# Patient Record
Sex: MALE | Race: OTHER | Employment: FULL TIME | URBAN - METROPOLITAN AREA
[De-identification: names, ages, dates, MRNs, and addresses within clinical notes are randomized per-mention and may not be internally consistent; named-entity substitution may affect disease eponyms.]

---

## 2021-04-08 ENCOUNTER — HOSPITAL ENCOUNTER (OUTPATIENT)
Dept: GENERAL RADIOLOGY | Facility: HOSPITAL | Age: 36
Discharge: HOME | End: 2021-04-08
Attending: INTERNAL MEDICINE
Payer: COMMERCIAL

## 2021-04-08 ENCOUNTER — OFFICE VISIT (OUTPATIENT)
Dept: RHEUMATOLOGY | Facility: CLINIC | Age: 36
End: 2021-04-08
Payer: COMMERCIAL

## 2021-04-08 ENCOUNTER — LAB ENCOUNTER (OUTPATIENT)
Dept: LAB | Facility: HOSPITAL | Age: 36
End: 2021-04-08
Attending: INTERNAL MEDICINE
Payer: COMMERCIAL

## 2021-04-08 VITALS
HEART RATE: 84 BPM | SYSTOLIC BLOOD PRESSURE: 132 MMHG | WEIGHT: 235 LBS | BODY MASS INDEX: 30.16 KG/M2 | HEIGHT: 74 IN | DIASTOLIC BLOOD PRESSURE: 87 MMHG

## 2021-04-08 DIAGNOSIS — M47.899 HLA-B27 SPONDYLOARTHROPATHY: ICD-10-CM

## 2021-04-08 DIAGNOSIS — M46.90 DACTYLITIS DUE TO SPONDYLOARTHRITIC DISORDER (HCC): ICD-10-CM

## 2021-04-08 DIAGNOSIS — G89.29 CHRONIC MIDLINE THORACIC BACK PAIN: ICD-10-CM

## 2021-04-08 DIAGNOSIS — M54.6 CHRONIC MIDLINE THORACIC BACK PAIN: ICD-10-CM

## 2021-04-08 DIAGNOSIS — M47.899 HLA-B27 SPONDYLOARTHROPATHY: Primary | ICD-10-CM

## 2021-04-08 PROCEDURE — 86480 TB TEST CELL IMMUN MEASURE: CPT

## 2021-04-08 PROCEDURE — 72202 X-RAY EXAM SI JOINTS 3/> VWS: CPT | Performed by: INTERNAL MEDICINE

## 2021-04-08 PROCEDURE — 3075F SYST BP GE 130 - 139MM HG: CPT | Performed by: INTERNAL MEDICINE

## 2021-04-08 PROCEDURE — 3079F DIAST BP 80-89 MM HG: CPT | Performed by: INTERNAL MEDICINE

## 2021-04-08 PROCEDURE — 86706 HEP B SURFACE ANTIBODY: CPT | Performed by: INTERNAL MEDICINE

## 2021-04-08 PROCEDURE — 99244 OFF/OP CNSLTJ NEW/EST MOD 40: CPT | Performed by: INTERNAL MEDICINE

## 2021-04-08 PROCEDURE — 86704 HEP B CORE ANTIBODY TOTAL: CPT | Performed by: INTERNAL MEDICINE

## 2021-04-08 PROCEDURE — 72072 X-RAY EXAM THORAC SPINE 3VWS: CPT | Performed by: INTERNAL MEDICINE

## 2021-04-08 PROCEDURE — 86235 NUCLEAR ANTIGEN ANTIBODY: CPT

## 2021-04-08 PROCEDURE — 86803 HEPATITIS C AB TEST: CPT | Performed by: INTERNAL MEDICINE

## 2021-04-08 PROCEDURE — 82955 ASSAY OF G6PD ENZYME: CPT | Performed by: INTERNAL MEDICINE

## 2021-04-08 PROCEDURE — 36415 COLL VENOUS BLD VENIPUNCTURE: CPT | Performed by: INTERNAL MEDICINE

## 2021-04-08 PROCEDURE — 87340 HEPATITIS B SURFACE AG IA: CPT | Performed by: INTERNAL MEDICINE

## 2021-04-08 PROCEDURE — 86225 DNA ANTIBODY NATIVE: CPT

## 2021-04-08 PROCEDURE — 3008F BODY MASS INDEX DOCD: CPT | Performed by: INTERNAL MEDICINE

## 2021-04-08 RX ORDER — METHYLPREDNISOLONE 4 MG/1
TABLET ORAL
Qty: 1 PACKAGE | Refills: 0 | Status: SHIPPED | OUTPATIENT
Start: 2021-04-08

## 2021-04-08 RX ORDER — FAMOTIDINE 20 MG/1
20 TABLET ORAL 2 TIMES DAILY
COMMUNITY
Start: 2020-01-30

## 2021-04-08 RX ORDER — IBUPROFEN 600 MG/1
600 TABLET ORAL 2 TIMES DAILY
COMMUNITY
Start: 2021-03-16

## 2021-04-08 RX ORDER — LEVOTHYROXINE SODIUM 0.03 MG/1
25 TABLET ORAL EVERY MORNING
COMMUNITY
Start: 2021-03-18

## 2021-04-08 NOTE — PATIENT INSTRUCTIONS
You were seen today for a positive HLA-B27, mid back pain and left fourth toe swelling  Symptoms appear to be consistent with ankylosing spondylitis.   Want to figure out if the ankylosing spondylitis is affecting your back  Plan to get some more blood work

## 2021-04-08 NOTE — PROGRESS NOTES
Herve Selby is a 29year old male who presents for Patient presents with:  Consult  Toe Pain: right toe pain  Back Pain  Test Results  .    HPI:   CC: R 4th toe pain and swelling, +HLA-B27  Consult: referred by podiatrist Dr. Eleanor Dominguez    This is a 3 Oral Tab Take 25 mcg by mouth every morning. ON AN EMPTY STOMACH     • ibuprofen 600 MG Oral Tab Take 600 mg by mouth 2 (two) times daily. History reviewed. No pertinent past medical history. History reviewed. No pertinent surgical history.    Yaya or swelling or warmth on palpation  Shoulders: FROM, no pain or swelling or warmth on palpation  Hip: normal log roll, no lateral hip pain, RYAN test negative b/l  Knees: FROM, no warmth or effusion present. No pain with ROM.    Ankles: FROM, no pain or sw

## 2021-04-13 ENCOUNTER — TELEPHONE (OUTPATIENT)
Dept: RHEUMATOLOGY | Facility: CLINIC | Age: 36
End: 2021-04-13

## 2021-04-13 DIAGNOSIS — M47.899 HLA-B27 SPONDYLOARTHROPATHY: Primary | ICD-10-CM

## 2021-04-13 DIAGNOSIS — M46.90 DACTYLITIS DUE TO SPONDYLOARTHRITIC DISORDER (HCC): ICD-10-CM

## 2021-04-14 NOTE — TELEPHONE ENCOUNTER
Patient is returning missed call. He was informed of message below but would like to know the results of his x-rays before proceeding with the MRI's. Patient is requesting a call back after 3 pm. He is currently at work.

## 2021-05-13 ENCOUNTER — HOSPITAL ENCOUNTER (OUTPATIENT)
Dept: MRI IMAGING | Facility: HOSPITAL | Age: 36
Discharge: HOME | End: 2021-05-13
Attending: INTERNAL MEDICINE
Payer: COMMERCIAL

## 2021-05-13 DIAGNOSIS — M47.899 HLA-B27 SPONDYLOARTHROPATHY: ICD-10-CM

## 2021-05-13 DIAGNOSIS — M46.90 DACTYLITIS DUE TO SPONDYLOARTHRITIC DISORDER (HCC): ICD-10-CM

## 2021-05-13 PROCEDURE — 72157 MRI CHEST SPINE W/O & W/DYE: CPT | Performed by: INTERNAL MEDICINE

## 2021-05-13 PROCEDURE — A9575 INJ GADOTERATE MEGLUMI 0.1ML: HCPCS | Performed by: INTERNAL MEDICINE

## 2021-05-13 PROCEDURE — 72195 MRI PELVIS W/O DYE: CPT | Performed by: INTERNAL MEDICINE

## 2021-05-20 ENCOUNTER — TELEPHONE (OUTPATIENT)
Dept: RHEUMATOLOGY | Facility: CLINIC | Age: 36
End: 2021-05-20

## 2021-05-20 NOTE — TELEPHONE ENCOUNTER
Patient called for some clarification regarding his call with Dr. Damien Diaz. He had questions about sulfasalazine. I informed him that this medication can be used to reduce inflammation and pain associated with his arthritis.  Patient also wanted to inform Dr. Tc Slater

## 2021-06-02 RX ORDER — SULFASALAZINE 500 MG/1
TABLET ORAL
Qty: 120 TABLET | Refills: 1 | Status: SHIPPED | OUTPATIENT
Start: 2021-06-02

## 2021-06-02 RX ORDER — CYCLOBENZAPRINE HCL 5 MG
5 TABLET ORAL NIGHTLY PRN
Qty: 30 TABLET | Refills: 0 | Status: SHIPPED | OUTPATIENT
Start: 2021-06-02

## 2021-06-02 NOTE — TELEPHONE ENCOUNTER
Patient with seropositive spondylarthritis, MRI of the thoracic and lumbar spine were normal, plan to start sulfasalazine and Flexeril for muscle spasms.   He will follow-up in 2 months

## 2023-08-01 ENCOUNTER — APPOINTMENT (OUTPATIENT)
Dept: ULTRASOUND IMAGING | Age: 38
End: 2023-08-01
Attending: FAMILY MEDICINE

## 2023-11-16 ENCOUNTER — OFFICE VISIT (OUTPATIENT)
Facility: CLINIC | Age: 38
End: 2023-11-16
Payer: COMMERCIAL

## 2023-11-16 VITALS
BODY MASS INDEX: 29.65 KG/M2 | HEIGHT: 74 IN | DIASTOLIC BLOOD PRESSURE: 78 MMHG | WEIGHT: 231 LBS | HEART RATE: 75 BPM | SYSTOLIC BLOOD PRESSURE: 128 MMHG

## 2023-11-16 DIAGNOSIS — K21.9 GASTROESOPHAGEAL REFLUX DISEASE WITHOUT ESOPHAGITIS: Primary | ICD-10-CM

## 2023-11-16 PROCEDURE — 3074F SYST BP LT 130 MM HG: CPT | Performed by: INTERNAL MEDICINE

## 2023-11-16 PROCEDURE — 99204 OFFICE O/P NEW MOD 45 MIN: CPT | Performed by: INTERNAL MEDICINE

## 2023-11-16 PROCEDURE — 3008F BODY MASS INDEX DOCD: CPT | Performed by: INTERNAL MEDICINE

## 2023-11-16 PROCEDURE — 3078F DIAST BP <80 MM HG: CPT | Performed by: INTERNAL MEDICINE

## 2023-11-16 NOTE — H&P
[address] [facility] [address] Gastroenterology                                                                                                               Reason for consult: inability to burp     Requesting physician or provider: No primary care provider on file. Chief Complaint   Patient presents with    Consult     Upper pressure; burping issues; GERD; slight upper pain; constant throat clearing        HPI:   Marlyn Chand is a 41year old year-old man with no significant past medical history presenting for evaluation of inability to burp. He feels like he has a blockage on the left side of his throat and chest. He describes this as feeling like he needs to burp but is unable to burp. He feels like the right side of his throat and chest does not have this issue. He will suck in air to try and burp and will be unable to. He also notes feeling of reflux with gurgling in his esophagus. He feels like food comes up but does not regurgitate or vomit. He denies nausea. He denies dysphagia. He does note some trouble breathing - he feels like he has to stop and take a deep breath and \"catch himself\". He also notes pain in his chest with coughing. He feels chest pressure to his left side. He was recently seen by his PCP and recommended to have CXR, which was normal. He was prescribed medrol dospak for costochondritis. He notes that his sx have been present since 08/2023. He was prescribed nexium in 09/2023 and took this for one month, then stopped this for about a month and has now restarted this. He has been taking nexium first thing in the morning but eats his first meal at lunch. He previously had an EGD in 2017 with LA grade A esophagitis.      Soc:  -Denies smoking  -Endorses social EtOH use    Wt Readings from Last 6 Encounters:   11/16/23 231 lb (104.8 kg)   04/08/21 235 lb (106.6 kg)        History, Medications, Allergies, ROS: History reviewed. No pertinent past medical history. History reviewed. No pertinent surgical history. Family Hx:   Family History   Family history unknown: Yes      Social History:   Social History     Socioeconomic History    Marital status:    Tobacco Use    Smoking status: Never    Smokeless tobacco: Never   Vaping Use    Vaping Use: Never used   Substance and Sexual Activity    Alcohol use: Yes     Comment: Social        Medications (Active prior to today's visit):  Current Outpatient Medications   Medication Sig Dispense Refill    sulfaSALAzine 500 MG Oral Tab With meals, 500 mg twice a day for 2 weeks then increase to 1000 mg twice a day 120 tablet 1    cyclobenzaprine 5 MG Oral Tab Take 1 tablet (5 mg total) by mouth nightly as needed for Muscle spasms. 30 tablet 0    famoTIDine 20 MG Oral Tab Take 20 mg by mouth 2 (two) times daily. Levothyroxine Sodium 25 MCG Oral Tab Take 25 mcg by mouth every morning. ON AN EMPTY STOMACH      ibuprofen 600 MG Oral Tab Take 600 mg by mouth 2 (two) times daily. methylPREDNISolone 4 MG Oral Tablet Therapy Pack Take as directed on package. 1 Package 0       Allergies:  No Known Allergies    ROS:   CONSTITUTIONAL:  negative for fevers, rigors  EYES:  negative for diplopia   RESPIRATORY:  negative for severe shortness of breath  CARDIOVASCULAR:  negative for crushing sub-sternal chest pain  GASTROINTESTINAL:  see HPI  GENITOURINARY:  negative for dysuria or gross hematuria  INTEGUMENT/BREAST:  SKIN:  negative for jaundice   ALLERGIC/IMMUNOLOGIC:  negative for hay fever  ENDOCRINE:  negative for cold intolerance and heat intolerance  MUSCULOSKELETAL:  negative for joint effusion/severe erythema  BEHAVIOR/PSYCH:  negative for psychotic behavior    PHYSICAL EXAM:   Blood pressure 128/78, pulse 75, height 6' 2\" (1.88 m), weight 231 lb (104.8 kg).     Gen: appears comfortable and in no acute distress  HEENT: sclera appear anicteric, oropharynx clear, mucus membranes appear moist  CV: regular rate, the extremities are warm and well-perfused   Lung: no increased work of breathing, no conversational dyspnea   Abd: soft, non-tender exam in all quadrants without rigidity or guarding, non-distended, no masses palpated  Skin: no jaundice, no apparent rashes   Neuro: alert and interactive, no focal neuro deficits  Psych: cooperative, normal affect     Labs/Imaging:     Patient's pertinent labs and imaging were reviewed and discussed with patient today. ASSESSMENT/PLAN:   Alyssa Stewart is a 41year old year-old man with no significant past medical history presenting for evaluation of inability to burp. Pt presenting with vague symptoms. He describes this feeling like he needs to burp or relieve gas pressure from his left side of his chest and throat, but is unable to. He does not feel the same on the right side. It is unclear if this is more a pulmonary issue vs GI issue since he describes left chest discomfort and some difficulty breathing but then relates this to burping. He also endorses reflux and feels like food comes up his esophagus. He denies regurgitation or vomiting. He denies dysphagia. He previously had an EGD in 2017 with LA grade A esophagitis. He restarted a daily PPI in September and then discontinued this for a month - he was recently restarted on this. Recommended he continue this for now with dosing 30-60 minutes before his first meal of the day. We discussed obtaining ENT evaluation since he notes throat discomfort ? Blockage on the left side and a fluoroesophagram. Given reflux and history of esophagitis, can plan to reassess esophagus with EGD. Recommendations:  Lifestyle and dietary modifications were discussed today including but not limited to:     *Elevated head of bed at night      *Refrain from laying down after consuming a meal and do not eat meals 2-3 hours before bedtime. *Avoid tight clothing or belts. *Weight loss as able. *Eliminate dietary triggers (examples: caffeine, chocolate, spicy foods, fried/fatty foods, peppermint and carbonated beverages). *Avoid tobacco and alcohol as both can worsen acid reflux (reduces to pressure of the lower esophageal valve and smoking also reduces saliva production). *Consider oral lozenges or chewing gum throughout the day (promotes saliva to neutralize refluxed acid). *Avoid NSAIDs (ibuprofen, motrin, aleve, naproxen, etc) and aspirin as able. Continue nexium for now. Take this once a day 30-60 minutes before your first meal of the day. Schedule a swallow x-ray. 1. Schedule an upper endoscopy (EGD) with MAC [Diagnosis: reflux unresponsive to PPI]    2. Do not eat or drink after midnight the night before your procedure. 3. Medication adjustments: Continue ALL medications as usual.    4. If you start any NEW medication after your visit today, please notify us. Certain medications will need to be held before the procedure, or the procedure cannot be performed. 5. You will need a ride home from your procedure since you are receiving sedation. Please ensure you will have an available ride home or the procedure cannot be performed. Orders This Visit:  No orders of the defined types were placed in this encounter.       Meds This Visit:  Requested Prescriptions      No prescriptions requested or ordered in this encounter       Imaging & Referrals:  ENT - INTERNAL  XR UGI/ESOPHAGUS DOUBLE CONTRAST (NCP=[id number])       Adenike Salcedo MD  [facility] Gastroenterology  11/16/2023

## 2023-11-16 NOTE — PATIENT INSTRUCTIONS
Lifestyle and dietary modifications were discussed today including but not limited to:     *Elevated head of bed at night      *Refrain from laying down after consuming a meal and do not eat meals 2-3 hours before bedtime. *Avoid tight clothing or belts. *Weight loss as able. *Eliminate dietary triggers (examples: caffeine, chocolate, spicy foods, fried/fatty foods, peppermint and carbonated beverages). *Avoid tobacco and alcohol as both can worsen acid reflux (reduces to pressure of the lower esophageal valve and smoking also reduces saliva production). *Consider oral lozenges or chewing gum throughout the day (promotes saliva to neutralize refluxed acid). *Avoid NSAIDs (ibuprofen, motrin, aleve, naproxen, etc) and aspirin as able. Continue nexium for now. Take this once a day 30-60 minutes before your first meal of the day. Schedule a swallow x-ray. 1. Schedule an upper endoscopy (EGD) with MAC [Diagnosis: reflux unresponsive to PPI]    2. Do not eat or drink after midnight the night before your procedure. 3. Medication adjustments: Continue ALL medications as usual.    4. If you start any NEW medication after your visit today, please notify us. Certain medications will need to be held before the procedure, or the procedure cannot be performed. 5. You will need a ride home from your procedure since you are receiving sedation. Please ensure you will have an available ride home or the procedure cannot be performed.

## 2024-02-29 ENCOUNTER — TELEPHONE (OUTPATIENT)
Facility: CLINIC | Age: 39
End: 2024-02-29

## 2024-02-29 DIAGNOSIS — K21.9 GASTROESOPHAGEAL REFLUX DISEASE, UNSPECIFIED WHETHER ESOPHAGITIS PRESENT: Primary | ICD-10-CM

## 2024-02-29 NOTE — TELEPHONE ENCOUNTER
Scheduled for:  EGD 07129  Provider Name:  Dr Guevara  Date:  03/01/2024   Location:  [facility]  Sedation:  MAC  Time:  11:30am(Patient is aware arrival time is at 10:30am)  Prep:  NPO after Midnight Prep Instructions Given At The Office Visit.    Meds/Allergies Reconciled?:  Physician Reviewed  Diagnosis with codes:  GERD K21.9  Was patient informed to call insurance with codes (Y/N):  Yes  Referral sent?:  Referral was sent at the time of electronic surgical scheduling.  EM or [facility] notified?:  I sent an electronic request to Endo Scheduling and received a confirmation today.  Medication Orders:  Pt is aware to NOT take iron pills, herbal meds and diet supplements for 7 days before exam. Also to NOT take any form of alcohol, recreational drugs and any forms of ED meds 24 hours before exam.   Misc Orders:  Patient was informed that they will need a COVID 19 test prior to their procedure. Patient verbally understood & will await a phone call from [facility] to schedule.       Further instructions given by staff:  I provide prep instructions to patient at the time of the appointment and reviewed date, time and location, he verbalized that he understood and is aware to call if he has any questions.    Patient was informed about the new cancellation policy for his/her procedure. Patient was also given a copy of the cancellation policy at the time of the appointment and verbalized understanding.

## 2024-03-01 ENCOUNTER — HOSPITAL ENCOUNTER (OUTPATIENT)
Age: 39
Setting detail: HOSPITAL OUTPATIENT SURGERY
Discharge: HOME | End: 2024-03-01
Attending: INTERNAL MEDICINE | Admitting: INTERNAL MEDICINE
Payer: COMMERCIAL

## 2024-03-01 ENCOUNTER — ANESTHESIA EVENT (OUTPATIENT)
Dept: ENDOSCOPY | Age: 39
End: 2024-03-01
Payer: COMMERCIAL

## 2024-03-01 ENCOUNTER — ANESTHESIA (OUTPATIENT)
Dept: ENDOSCOPY | Age: 39
End: 2024-03-01
Payer: COMMERCIAL

## 2024-03-01 VITALS
RESPIRATION RATE: 16 BRPM | DIASTOLIC BLOOD PRESSURE: 73 MMHG | BODY MASS INDEX: 29.52 KG/M2 | WEIGHT: 230 LBS | OXYGEN SATURATION: 99 % | TEMPERATURE: 98 F | HEART RATE: 52 BPM | HEIGHT: 74 IN | SYSTOLIC BLOOD PRESSURE: 102 MMHG

## 2024-03-01 DIAGNOSIS — J39.2 THROAT IRRITATION: Primary | ICD-10-CM

## 2024-03-01 DIAGNOSIS — K21.9 GASTROESOPHAGEAL REFLUX DISEASE, UNSPECIFIED WHETHER ESOPHAGITIS PRESENT: ICD-10-CM

## 2024-03-01 PROCEDURE — 43239 EGD BIOPSY SINGLE/MULTIPLE: CPT | Performed by: INTERNAL MEDICINE

## 2024-03-01 PROCEDURE — 88312 SPECIAL STAINS GROUP 1: CPT | Performed by: INTERNAL MEDICINE

## 2024-03-01 PROCEDURE — 99070 SPECIAL SUPPLIES PHYS/QHP: CPT | Performed by: INTERNAL MEDICINE

## 2024-03-01 PROCEDURE — 88305 TISSUE EXAM BY PATHOLOGIST: CPT | Performed by: INTERNAL MEDICINE

## 2024-03-01 RX ORDER — LIDOCAINE HYDROCHLORIDE 10 MG/ML
INJECTION, SOLUTION EPIDURAL; INFILTRATION; INTRACAUDAL; PERINEURAL AS NEEDED
Status: DISCONTINUED | OUTPATIENT
Start: 2024-03-01 | End: 2024-03-01 | Stop reason: SURG

## 2024-03-01 RX ORDER — ESOMEPRAZOLE MAGNESIUM 40 MG/1
40 CAPSULE, DELAYED RELEASE ORAL
COMMUNITY

## 2024-03-01 RX ORDER — SODIUM CHLORIDE, SODIUM LACTATE, POTASSIUM CHLORIDE, CALCIUM CHLORIDE 600; 310; 30; 20 MG/100ML; MG/100ML; MG/100ML; MG/100ML
INJECTION, SOLUTION INTRAVENOUS CONTINUOUS
Status: DISCONTINUED | OUTPATIENT
Start: 2024-03-01 | End: 2024-03-01

## 2024-03-01 RX ORDER — NALOXONE HYDROCHLORIDE 0.4 MG/ML
0.08 INJECTION, SOLUTION INTRAMUSCULAR; INTRAVENOUS; SUBCUTANEOUS ONCE AS NEEDED
Status: DISCONTINUED | OUTPATIENT
Start: 2024-03-01 | End: 2024-03-01

## 2024-03-01 RX ADMIN — SODIUM CHLORIDE, SODIUM LACTATE, POTASSIUM CHLORIDE, CALCIUM CHLORIDE: 600; 310; 30; 20 INJECTION, SOLUTION INTRAVENOUS at 11:16:00

## 2024-03-01 RX ADMIN — LIDOCAINE HYDROCHLORIDE 50 MG: 10 INJECTION, SOLUTION EPIDURAL; INFILTRATION; INTRACAUDAL; PERINEURAL at 11:27:00

## 2024-03-01 RX ADMIN — SODIUM CHLORIDE, SODIUM LACTATE, POTASSIUM CHLORIDE, CALCIUM CHLORIDE: 600; 310; 30; 20 INJECTION, SOLUTION INTRAVENOUS at 11:39:00

## 2024-03-01 NOTE — OPERATIVE REPORT
ESOPHAGOGASTRODUODENOSCOPY (EGD) REPORT    Maxx Ceja     3/14/1985 Age 38 year old   PCP No primary care provider on file. Endoscopist Sera Guevara MD       Date of procedure: 24    Procedure: EGD w/ biopsies     Pre-operative diagnosis: uncontrolled GERD with history of esophagitis     Post-operative diagnosis: irregular z-line     Medications: MAC    Complications: none    Procedure:  Informed consent was obtained from the patient after the risks of the procedure were discussed, including but not limited to bleeding, perforation, aspiration, infection, or possibility of a missed lesion. After discussions of the risks/benefits and alternatives to this procedure, as well as the planned sedation, the patient was placed in the left lateral decubitus position and begun on continuous blood pressure pulse oximetry and EKG monitoring and this was maintained throughout the procedure. Once an adequate level of sedation was obtained a bite block was placed. Then the lubricated tip of the [id number] diagnostic video upper endoscope was inserted and advanced using direct visualization into the posterior pharynx and ultimately into the esophagus. The scope was then advanced through the stomach and to the second portion of the duodenum.    Complications: None    Findings:      1. Esophagus: The squamocolumnar junction was noted at 44 cm and appeared irregular. The diaphragmatic pinch was noted noted at 44 cm from the incisors. The esophageal mucosa appeared normal. There was no endoscopic findings of esophagitis, stricture or Cloud's esophagus. No diverticulum noted in the upper esophagus. Biopsies obtained from the distal and proximal esophagus.     2. Stomach: The stomach distended normally. Normal rugal folds were seen. The pylorus was patent. Retroflexion revealed a normal fundus. The gastric mucosa appeared normal.     3. Duodenum: The duodenal mucosa appeared normal in the bulb and 2nd portion  of the duodenum.     Impression:  -Esophagus: Irregular z-line; otherwise, normal. Biopsies obtained from the proximal and distal esophagus.   -Stomach: Normal.   -Duodenum: Normal.     Recommend:  1. Await pathology.   2. Continue nexium.   3. Complete fluoroesophagram.   4. Follow-up with ENT regarding throat issue.   5. Follow-up with PCP regarding thyroid issue.     >>>If tissue was sampled/removed and you have not received your pathology results either by phone or letter within 2 weeks, please call our office at [phone].    Specimens:  Distal esophageal biopsies, proximal esophageal biopsies     Blood loss: <1 ml

## 2024-03-01 NOTE — H&P
History & Physical Examination    Patient Name: Maxx Ceja  MRN: [id number]  [facility]: [id number]  YOB: 1985    Diagnosis: uncontrolled GERD with history of esophagitis, EGD today    Medications Prior to Admission   Medication Sig Dispense Refill Last Dose    sulfaSALAzine 500 MG Oral Tab With meals, 500 mg twice a day for 2 weeks then increase to 1000 mg twice a day 120 tablet 1     cyclobenzaprine 5 MG Oral Tab Take 1 tablet (5 mg total) by mouth nightly as needed for Muscle spasms. 30 tablet 0     famoTIDine 20 MG Oral Tab Take 20 mg by mouth 2 (two) times daily.       Levothyroxine Sodium 25 MCG Oral Tab Take 25 mcg by mouth every morning. ON AN EMPTY STOMACH       ibuprofen 600 MG Oral Tab Take 600 mg by mouth 2 (two) times daily.       methylPREDNISolone 4 MG Oral Tablet Therapy Pack Take as directed on package. 1 Package 0      No current facility-administered medications for this encounter.       Allergies: No Known Allergies    No past medical history on file.  No past surgical history on file.  Family History   Family history unknown: Yes     Social History     Tobacco Use    Smoking status: Never    Smokeless tobacco: Never   Substance Use Topics    Alcohol use: Yes     Comment: Social       SYSTEM Check if Review is Normal Check if Physical Exam is Normal If not normal, please explain:   HEENT [X ] [ X]    NECK  [X ] [ X]    HEART [X ] [ X]    LUNGS [X ] [ X]    ABDOMEN [X ] [ X]    EXTREMITIES [X ] [ X]    OTHER        I have discussed the risks and benefits and alternatives of the procedure with the patient/family.  They understand and agree to proceed with plan of care.   I have reviewed the History and Physical done within the last 30 days.  Any changes noted above.  Sera Guevara MD  [facility] Gastroenterology

## 2024-03-01 NOTE — ANESTHESIA PREPROCEDURE EVALUATION
Anesthesia PreOp Note    HPI:     Maxx Ceja is a 38 year old male who presents for preoperative consultation requested by: Sera Guevara MD    Date of Surgery: 3/1/2024    Procedure(s):  ESOPHAGOGASTRODUODENOSCOPY  Indication: Gastroesophageal reflux disease, unspecified whether esophagitis present    Relevant Problems   No relevant active problems       NPO:                         History Review:  There are no problems to display for this patient.      No past medical history on file.    No past surgical history on file.    Medications Prior to Admission   Medication Sig Dispense Refill Last Dose    sulfaSALAzine 500 MG Oral Tab With meals, 500 mg twice a day for 2 weeks then increase to 1000 mg twice a day 120 tablet 1     cyclobenzaprine 5 MG Oral Tab Take 1 tablet (5 mg total) by mouth nightly as needed for Muscle spasms. 30 tablet 0     famoTIDine 20 MG Oral Tab Take 20 mg by mouth 2 (two) times daily.       Levothyroxine Sodium 25 MCG Oral Tab Take 25 mcg by mouth every morning. ON AN EMPTY STOMACH       ibuprofen 600 MG Oral Tab Take 600 mg by mouth 2 (two) times daily.       methylPREDNISolone 4 MG Oral Tablet Therapy Pack Take as directed on package. 1 Package 0      No current Epic-ordered facility-administered medications on file.     No current Epic-ordered outpatient medications on file.       No Known Allergies    Family History   Family history unknown: Yes     Social History     Socioeconomic History    Marital status:    Tobacco Use    Smoking status: Never    Smokeless tobacco: Never   Vaping Use    Vaping Use: Never used   Substance and Sexual Activity    Alcohol use: Yes     Comment: Social       Available pre-op labs reviewed.             Vital Signs:  There is no height or weight on file to calculate BMI.   vitals were not taken for this visit.   There were no vitals filed for this visit.     Anesthesia Evaluation     Patient summary reviewed and Nursing notes  reviewed    Airway   Mallampati: II  TM distance: >3 FB  Neck ROM: full  Dental      Pulmonary - negative ROS and normal exam    breath sounds clear to auscultation  Cardiovascular - negative ROS and normal exam  Exercise tolerance: good    Rhythm: regular  Rate: normal    Neuro/Psych - negative ROS     GI/Hepatic/Renal    (+) GERD well controlled    Endo/Other    (+) hypothyroidism  Abdominal  - normal exam                 Anesthesia Plan:   ASA:  2  Plan:   MAC  Informed Consent Plan and Risks Discussed With:  Patient  Discussed plan with:  CRNA and surgeon      I have informed Maxx Ceja and/or legal guardian or family member of the nature of the anesthetic plan, benefits, risks including possible dental damage if relevant, major complications, and any alternative forms of anesthetic management.   All of the patient's questions were answered to the best of my ability. The patient desires the anesthetic management as planned.  Joe Veliz CRNA  3/1/2024 10:51 AM  Present on Admission:  **None**

## 2024-03-01 NOTE — DISCHARGE INSTRUCTIONS
Home Care Instructions for Gastroscopy with Sedation    Diet:  - Resume your regular diet as tolerated unless otherwise instructed.  - Start with light meals to minimize bloating.  - Do not drink alcohol today.    Medication:  - If you have questions about resuming your normal medications, please contact your Primary Care Physician.    Activities:  - Take it easy today. Do not return to work today.  - Do not drive today.  - Do not operate any machinery today (including kitchen equipment).    Gastroscopy:  - You may have a sore throat for 2-3 days following the exam. This is normal. Gargling with warm salt water (1/2 tsp salt to 1 glass warm water) or using throat lozenges will help.  - If you experience any sharp pain in your neck, abdomen or chest, vomiting of blood, oral temperature over 100 degrees Fahrenheit, light-headedness or dizziness, or any other problems, contact your doctor.    **If unable to reach your doctor, please go to the [facility] Emergency Room**    - Your referring physician will receive a full report of your examination.  - If you do not hear from your doctor's office within two weeks of your biopsy, please call them for your results.    You may be able to see your laboratory results in [employer] between 4 and 7 business days.  In some cases, your physician may not have viewed the results before they are released to [employer].  If you have questions regarding your results contact the physician who ordered the test/exam by phone or via [employer] by choosing \"Ask a Medical Question.\"

## 2024-03-01 NOTE — ANESTHESIA POSTPROCEDURE EVALUATION
Patient: Maxx Ceja    Procedure Summary       Date: 03/01/24 Room / Location: [facility] ENDOSCOPY 02 / [facility] ENDO    Anesthesia Start: 1125 Anesthesia Stop: 1144    Procedure: ESOPHAGOGASTRODUODENOSCOPY Diagnosis:       Gastroesophageal reflux disease, unspecified whether esophagitis present      (normal EGD)    Surgeons: Sera Guevara MD Anesthesiologist:     Anesthesia Type: MAC ASA Status: 2            Anesthesia Type: MAC    Vitals Value Taken Time   BP 96/59 03/01/24 1143   Temp 97.7 °F (36.5 °C) 03/01/24 1143   Pulse 62 03/01/24 1143   Resp 18 03/01/24 1143   SpO2 97 % 03/01/24 1143       [facility] AN Post Evaluation:   Patient Evaluated in PACU  Patient Participation: complete - patient participated  Level of Consciousness: responsive to verbal stimuli and sleepy but conscious  Pain Score: 0  Pain Management: adequate  Airway Patency:patent  Dental exam unchanged from preop  Yes    Nausea/Vomiting: none  Cardiovascular Status: blood pressure returned to baseline, hemodynamically stable and acceptable  Respiratory Status: room air, nonlabored ventilation, spontaneous ventilation and acceptable  Postoperative Hydration stable      Joe Veliz CRNA  3/1/2024 11:44 AM

## 2024-03-04 NOTE — PROGRESS NOTES
EGD biopsies c/w reflux changes. No reflux changes noted endoscopically on exam. Continue PPI daily and follow GERD lifestyle modifications.

## 2024-03-06 ENCOUNTER — TELEPHONE (OUTPATIENT)
Facility: CLINIC | Age: 39
End: 2024-03-06

## 2024-03-06 NOTE — TELEPHONE ENCOUNTER
1st reminder letter sent out via mail and [employer] for the following:   XR UGI/ESOPHAGUS DOUBLE CONTRAST (CPT=74246) (Order #[id number]) on 11/16/23

## 2024-11-13 ENCOUNTER — TELEPHONE (OUTPATIENT)
Dept: ORTHOPEDICS CLINIC | Facility: CLINIC | Age: 39
End: 2024-11-13

## 2024-11-13 DIAGNOSIS — M54.9 MID BACK PAIN: Primary | ICD-10-CM

## 2024-11-13 NOTE — TELEPHONE ENCOUNTER
[facility] Guzman on 11/25/24 for Chronic mid back pain. Please advise if imaging is needed prior.

## 2024-11-25 ENCOUNTER — OFFICE VISIT (OUTPATIENT)
Dept: ORTHOPEDICS CLINIC | Facility: CLINIC | Age: 39
End: 2024-11-25
Payer: COMMERCIAL

## 2024-11-25 DIAGNOSIS — M54.50 CHRONIC RIGHT-SIDED LOW BACK PAIN WITHOUT SCIATICA: Primary | ICD-10-CM

## 2024-11-25 DIAGNOSIS — G89.29 CHRONIC RIGHT-SIDED LOW BACK PAIN WITHOUT SCIATICA: Primary | ICD-10-CM

## 2024-11-25 PROCEDURE — 99204 OFFICE O/P NEW MOD 45 MIN: CPT | Performed by: STUDENT IN AN ORGANIZED HEALTH CARE EDUCATION/TRAINING PROGRAM

## 2024-11-26 NOTE — H&P
[facility] - ORTHOPEDICS  [address], [address], [place] [address]  [address] [place] [id number]  [phone]     NEW PATIENT VISIT - HISTORY AND PHYSICAL EXAMINATION     Name: Maxx Ceja   MRN: [id number]  Date: 11/25/24       CC: back pain    REFERRED BY: No primary care provider on file.    HPI:   Maxx Ceja is a very pleasant 39 year old male who presents today for evaluation of back pain. The distribution of symptoms are: 100% backpain and 0% leg pain. The symptoms began 5 month(s) ago without any significant injury. Since the onset, the symptoms have remained the same. Patient feels pain is aggravated by bending, twisting and improved by rest. The patient reports no numbness and no weakness.  The symptom characteristics are as follows: Patient is a 39-year-old male presenting with predominantly right side back pain.  No radicular symptoms.     Prior spine surgery: none.    Bowel and bladder symptoms: absent.    The patient has not had issues with balance and/or hand dexterity problems such as changes in penmanship or the use of buttons or zippers.    Treatment up to this time has included:    Evaluation: PCP and other spine surgeon  NSAIDS: have worked well  Narcotic use: None  Physical therapy: completed  Spinal injections: None  Others:       PMH:   History reviewed. No pertinent past medical history.    PAST SURGICAL HX:  History reviewed. No pertinent surgical history.    FAMILY HX:  Family History   Family history unknown: Yes       ALLERGIES:  Patient has no known allergies.    MEDICATIONS:   Current Outpatient Medications   Medication Sig Dispense Refill    Esomeprazole Magnesium 40 MG Oral Capsule Delayed Release Take 1 capsule (40 mg total) by mouth every morning before breakfast.      sulfaSALAzine 500 MG Oral Tab With meals, 500 mg twice a day for 2 weeks then increase to 1000 mg twice a day 120 tablet 1    cyclobenzaprine 5 MG Oral Tab Take 1  tablet (5 mg total) by mouth nightly as needed for Muscle spasms. 30 tablet 0    famoTIDine 20 MG Oral Tab Take 20 mg by mouth 2 (two) times daily.      Levothyroxine Sodium 25 MCG Oral Tab Take 25 mcg by mouth every morning. ON AN EMPTY STOMACH      ibuprofen 600 MG Oral Tab Take 600 mg by mouth 2 (two) times daily.      methylPREDNISolone 4 MG Oral Tablet Therapy Pack Take as directed on package. (Patient not taking: Reported on 3/1/2024) 1 Package 0       ROS: A comprehensive 14 point review of systems was performed and was negative aside from the aforementioned per history of present illness.    SOCIAL HX:  Social History     Tobacco Use    Smoking status: Never    Smokeless tobacco: Never   Substance Use Topics    Alcohol use: Yes     Comment: Social       PE:   There were no vitals filed for this visit.  Estimated body mass index is 29.53 kg/m² as calculated from the following:    Height as of 3/1/24: 6' 2\" (1.88 m).    Weight as of 3/1/24: 230 lb (104.3 kg).    Physical Exam  Constitutional:       Appearance: Normal appearance.   HENT:      Head: Normocephalic and atraumatic.   Eyes:      Extraocular Movements: Extraocular movements intact.   Cardiovascular:      Pulses: Normal pulses. Skin warm and well perfused.  Pulmonary:      Effort: Pulmonary effort is normal. No respiratory distress.   Skin:     General: Skin is warm.   Psychiatric:         Mood and Affect: Mood normal.     Spine Exam:    Normal gait without difficulty  Able to heel, toe, tandem gait without difficulty  Level shoulders and hips in even stance    Restricted L-spine ROM    No tenderness to palpation of L-spine    Straight leg raise test: negative    Sustained clonus: negative    LE Strength: 5/5 IP QUAD TA EHL GSC  LE Sensation: normal in L2-S1 distribution  LE reflexes: normal    Radiographic Examination/Diagnostics:  XR and MRI personally viewed, independently interpreted and radiology report was reviewed.  X-ray of the lumbar spine  demonstrates mild degenerative changes  MRI of the lumbar spine does not demonstrate any significant canal or foraminal stenosis.  There is a small right paracentral disc protrusion and annular tear    IMPRESSION/PLAN: Maxx Ceja is a 39 year old male low back pain, no significant neurologic compression.  Pain is more myofascial.  Recommend pain management for injection therapy.     Сергей Guzman MD  Orthopedic Spine Surgeon  [facility]   [address] [place] [address].[facility]  [email or web address]  t: [phone]   f: [phone]        This note was dictated using Dragon software.  While it was briefly proofread prior to completion, some grammatical, spelling, and word choice errors due to dictation may still occur.

## 2025-02-18 ENCOUNTER — LAB ENCOUNTER (OUTPATIENT)
Dept: LAB | Facility: HOSPITAL | Age: 40
End: 2025-02-18
Attending: INTERNAL MEDICINE
Payer: COMMERCIAL

## 2025-02-18 ENCOUNTER — OFFICE VISIT (OUTPATIENT)
Dept: RHEUMATOLOGY | Facility: CLINIC | Age: 40
End: 2025-02-18

## 2025-02-18 ENCOUNTER — HOSPITAL ENCOUNTER (OUTPATIENT)
Dept: GENERAL RADIOLOGY | Facility: HOSPITAL | Age: 40
Discharge: HOME | End: 2025-02-18
Attending: INTERNAL MEDICINE
Payer: COMMERCIAL

## 2025-02-18 VITALS
WEIGHT: 230 LBS | DIASTOLIC BLOOD PRESSURE: 75 MMHG | SYSTOLIC BLOOD PRESSURE: 119 MMHG | BODY MASS INDEX: 29.52 KG/M2 | HEART RATE: 69 BPM | HEIGHT: 74 IN

## 2025-02-18 DIAGNOSIS — Z15.89 HLA B27 POSITIVE: ICD-10-CM

## 2025-02-18 DIAGNOSIS — M54.9 MID BACK PAIN: ICD-10-CM

## 2025-02-18 DIAGNOSIS — M54.9 MID BACK PAIN: Primary | ICD-10-CM

## 2025-02-18 LAB
CRP SERPL-MCNC: <0.4 MG/DL (ref ?–1)
ERYTHROCYTE [SEDIMENTATION RATE] IN BLOOD: 13 MM/HR

## 2025-02-18 PROCEDURE — 3074F SYST BP LT 130 MM HG: CPT | Performed by: INTERNAL MEDICINE

## 2025-02-18 PROCEDURE — 36415 COLL VENOUS BLD VENIPUNCTURE: CPT | Performed by: INTERNAL MEDICINE

## 2025-02-18 PROCEDURE — 85652 RBC SED RATE AUTOMATED: CPT | Performed by: INTERNAL MEDICINE

## 2025-02-18 PROCEDURE — 86140 C-REACTIVE PROTEIN: CPT | Performed by: INTERNAL MEDICINE

## 2025-02-18 PROCEDURE — 3008F BODY MASS INDEX DOCD: CPT | Performed by: INTERNAL MEDICINE

## 2025-02-18 PROCEDURE — 72202 X-RAY EXAM SI JOINTS 3/> VWS: CPT | Performed by: INTERNAL MEDICINE

## 2025-02-18 PROCEDURE — 3078F DIAST BP <80 MM HG: CPT | Performed by: INTERNAL MEDICINE

## 2025-02-18 PROCEDURE — 99204 OFFICE O/P NEW MOD 45 MIN: CPT | Performed by: INTERNAL MEDICINE

## 2025-02-18 RX ORDER — MELOXICAM 7.5 MG/1
7.5 TABLET ORAL 2 TIMES DAILY
Qty: 60 TABLET | Refills: 0 | Status: SHIPPED | OUTPATIENT
Start: 2025-02-18

## 2025-02-19 ENCOUNTER — TELEPHONE (OUTPATIENT)
Dept: RHEUMATOLOGY | Facility: CLINIC | Age: 40
End: 2025-02-19

## 2025-02-19 NOTE — PATIENT INSTRUCTIONS
You were seen today for the history of positive HLA-B27  You having some worsening pain in your right mid back and now in your left  Lets evaluate again with x-rays and MRI of your SI joint  Lets also get blood work today for the inflammation markers  Try meloxicam once or twice a day  Do not take with Aleve or Motrin ibuprofen

## 2025-02-19 NOTE — PROGRESS NOTES
Maxx Ceja is a 39 year old male who presents for   Chief Complaint   Patient presents with    Joint Pain    Back Pain    Consult     Seropositive spondylarthritis   .   HPI:   CC: back pain, +HLA-B27  Consult: referred by PCP Dr. Nicolás Ayoub    This is a 38 yo M with no significant medical history presents to evaluate for back pain.  He was seen back in 2021 due to a positive HLA-B27.  Around that time he also had right fourth toe pain and swelling.  His right fourth toe was injected with cortisone by podiatry.  He had an MRI of the SI joint showing no evidence of sacroiliitis.  He also had midthoracic back pain at that time an MRI of the thoracic spine was done which was unremarkable.  He now presents with worsening mid back pain.  Around June 2024 he was running and getting ready for a 5K.  He reports having right sided mid back pain that would come and go but then became more persistent.  He was following with orthopedic and thought was more muscular.  He has tried 2 sessions of physical therapy but did not help.  He was given a muscle relaxer around September and symptoms went away for about a month but then returned back in December.  He states the pain is more in the mid spine near the ribs.  He was also given a Medrol Dosepak but did not help.  At times the pain can be a pinching and burning sensation.  He is currently taking a muscle relaxer and symptoms are not as severe. Feels 80-90% better.  He is now having pain on the left mid back region.  Any motions like twisting aggravates it.  He tried dry needling and massage as but was only temporary relief.  Denies any lower back pain.  Denies any other joint pain or swelling.  No tendinitis review-itis.  Had a recent MRI of the lower spine September 2024 showing right disc protrusion at L5-S1 and facet degeneration    Blood work:  + HLA-B27  +ASIYA 1:80 nuclear speckled pattern, neg AMIRA panel  Neg RF, CCP, CRP, ESR  MRI SI joint 2021: No evidence of  sacroiliitis      Wt Readings from Last 2 Encounters:   02/18/25 230 lb (104.3 kg)   03/01/24 230 lb (104.3 kg)     Body mass index is 29.53 kg/m².      Current Outpatient Medications   Medication Sig Dispense Refill    Esomeprazole Magnesium 40 MG Oral Capsule Delayed Release Take 1 capsule (40 mg total) by mouth every morning before breakfast.      sulfaSALAzine 500 MG Oral Tab With meals, 500 mg twice a day for 2 weeks then increase to 1000 mg twice a day 120 tablet 1    cyclobenzaprine 5 MG Oral Tab Take 1 tablet (5 mg total) by mouth nightly as needed for Muscle spasms. 30 tablet 0    famoTIDine 20 MG Oral Tab Take 20 mg by mouth 2 (two) times daily.      Levothyroxine Sodium 25 MCG Oral Tab Take 25 mcg by mouth every morning. ON AN EMPTY STOMACH      ibuprofen 600 MG Oral Tab Take 600 mg by mouth 2 (two) times daily.      methylPREDNISolone 4 MG Oral Tablet Therapy Pack Take as directed on package. (Patient not taking: Reported on 3/1/2024) 1 Package 0      No past medical history on file.   No past surgical history on file.   Family History   Family history unknown: Yes      Social History:  Social History     Socioeconomic History    Marital status:    Tobacco Use    Smoking status: Never    Smokeless tobacco: Never   Vaping Use    Vaping status: Never Used   Substance and Sexual Activity    Alcohol use: Yes     Comment: Social    Drug use: Never           REVIEW OF SYSTEMS:   Review Of Systems:  Constitutional: No fever, no change in weight or appetitie  Derm: No rashes, no oral ulcers, no alopecia, no photosensitivity, no psoriasis  HEENT: No dry eyes, no dry mouth, no Raynaud's, no nasal ulcers, no parotid swelling, no neck pain, no jaw pain, no temple pain  Eyes: No visual changes,   CVS: No chest pain, no heart disease  RS: No SOB, no Cough, No Pleurtic pain,   GI: No nausea, no vomiiting, no abominal pain, no hx of ulcer, no gastritis, no heartburn, no dyshpagia, no BRBPR or melena  : no  dysuria, no hx of miscarriages, no DVT Hx, no hx of OCP,   Neuro: No numbness or tingling, no headache, no hx of seizures,   Psych: no hx of anxiety or depression  ENDO: no hx of thyroid disease, no hx of DM  Joint/Muscluskeltal: see HPI,   All other ROS are negative.     EXAM:   /75 (BP Location: Left arm, Patient Position: Sitting, Cuff Size: adult)   Pulse 69   Ht 6' 2\" (1.88 m)   Wt 230 lb (104.3 kg)   BMI 29.53 kg/m²   GEN: AAOx3, NAD  HEENT: EOMI, PERRLA, no injection or icterus, oral mucosa moist, no oral lesions. No lymphadenopathy. No facial rash  CVS: RRR, no murmurs rubs or gallops. Equal 2+ distal pulses.   LUNGS: CTAB, no increased work of breathing  ABDOMEN:  soft NT/ND, +BS, no HSM  SKIN: No rashes or skin lesions. No nail findings  MSK:  Cervical spine: FROM  Hands: no synovitis in DIP, PIP and MCP, strong full fists  Wrist: FROM, no pain or swelling or warmth on palpation  Elbow: FROM, no pain or swelling or warmth on palpation  Shoulders: FROM, no pain or swelling or warmth on palpation  Hip: normal log roll, no lateral hip pain, RYAN test negative b/l  Knees: FROM, no warmth or effusion present. No pain with ROM.   Ankles: FROM, no pain or swelling or warmth on palpation  Feet: no pain with MTP squeeze, no toe swelling or pain or warmth on palpation with FROM  Spine: no lumbar or sacral pain on palpation.  NEURO: Cranial nerves II-XII intact grossly. 5/5 strength throughout in both upper and lower extremities, sensation intact.  PSYCH: normal mood    LABS:     Component      Latest Ref Rng 4/8/2021   Anti Double Strand DNA      <10  <10    Anti-Maciel/RNP Antibody      Negative  Negative    Anti-Sjogren's B      Negative  Negative    Anti-Sjogren's A      Negative  Negative    Anti-Smith Antibody      Negative  Negative        IMAGING:     MRI SI joint 2021:  Mild degenerative changes of the sacroiliac joints without sacroiliitis.     ASSESSMENT AND PLAN:     Mid thoracic back pain  - He  is having mid thoracic right sided back pain since the summer 2024  - He was found to have a positive HLA-B27, MRI of the SI joint in 2021 showed no evidence of sacroiliitis  - He had a recent MRI of the lower spine September 2024 showing right disc protrusion at L5-S1 and facet degeneration  - Symptoms have improved on Flexeril about 80 to 90%  - Plan to obtain blood work for inflammation  - Plan to also repeat x-ray of the SI joint and MRI of the SI joint  - Symptoms do not appear to be consistent with ankylosing spondylitis.  - I did prescribe meloxicam to take once or twice a day as needed.  Advised to avoid any other NSAIDs and to take with food  - Depending on results he may have to see pain management    Thank you for allowing me to participate in this patients care. Pt will be notified of results and if follow-up is needed    Janey Spencer MD  2/18/2025  6:03 PM

## 2025-02-19 NOTE — TELEPHONE ENCOUNTER
Current Outpatient Medications   Medication Sig Dispense Refill    Meloxicam 7.5 MG Oral Tab Take 1 tablet (7.5 mg total) by mouth in the morning and 1 tablet (7.5 mg total) before bedtime. 60 tablet 0       Key: [id number]

## 2025-02-26 NOTE — TELEPHONE ENCOUNTER
Called [facility] and was connected to [facility]. Informed by T[employer] that patient doesn't have active insurance.    No PA was needed patient picked up medication on 2/24/25

## (undated) DEVICE — KIT CLEAN ENDOKIT 1.1OZ GOWNX2

## (undated) DEVICE — KIT ENDO ORCAPOD 160/180/190

## (undated) DEVICE — YANKAUER,BULB TIP,W/O VENT,RIGID,STERILE: Brand: MEDLINE

## (undated) DEVICE — 60 ML SYRINGE REGULAR TIP: Brand: MONOJECT

## (undated) DEVICE — CONMED SCOPE SAVER BITE BLOCK, 20X27 MM: Brand: SCOPE SAVER

## (undated) DEVICE — Device: Brand: DUAL NARE NASAL CANNULAE FEMALE LUER CON 7FT O2 TUBE

## (undated) DEVICE — GIJAW SINGLE-USE BIOPSY FORCEPS WITH NEEDLE: Brand: GIJAW

## (undated) NOTE — LETTER
[facility]  Administration of Anesthesia  IMaxx agree to be cared for by a physician anesthesiologist alone and/or with a nurse anesthetist, who is specially trained to monitor me and give me medicine to put me to sleep or keep me comfortable during my procedure    I understand that my anesthesiologist and/or anesthetist is not an employee or agent of [facility] or [facility] [employer]. He or she works for [facility]    As the patient asking for anesthesia services, I agree to:  Allow the anesthesiologist (anesthesia doctor) to give me medicine and do additional procedures as necessary. Some examples are: Starting or using an “IV” to give me medicine, fluids or blood during my procedure, and having a breathing tube placed to help me breathe when I’m asleep (intubation). In the event that my heart stops working properly, I understand that my anesthesiologist will make every effort to sustain my life, unless otherwise directed by [facility] Do Not Resuscitate documents.  Tell my anesthesia doctor before my procedure:  If I am pregnant.  The last time that I ate or drank.  iii. All of the medicines I take (including prescriptions, herbal supplements, and pills I can buy without a prescription (including street drugs/illegal medications). Failure to inform my anesthesiologist about these medicines may increase my risk of anesthetic complications.  iv.If I am allergic to anything or have had a reaction to anesthesia before.  I understand how the anesthesia medicine will help me (benefits).  I understand that with any type of anesthesia medicine there are risks:  The most common risks are: nausea, vomiting, sore throat, muscle soreness, damage to my eyes, mouth, or teeth (from breathing tube placement).  Rare risks include: remembering what happened during my procedure, allergic reactions to medications, injury to my airway, heart, lungs, vision, nerves, or  muscles and in extremely rare instances death.  My doctor has explained to me other choices available to me for my care (alternatives).  Pregnant Patients (“epidural”):  I understand that the risks of having an epidural (medicine given into my back to help control pain during labor), include itching, low blood pressure, difficulty urinating, headache or slowing of the baby’s heart. Very rare risks include infection, bleeding, seizure, irregular heart rhythms and nerve injury.  Regional Anesthesia (“spinal”, “epidural”, & “nerve blocks”):  I understand that rare but potential complications include headache, bleeding, infection, seizure, irregular heart rhythms, and nerve injury.    _____________________________________________________________________________  Patient (or Representative) Signature/Relationship to Patient  Date   Time    _____________________________________________________________________________   Name (if used)    Language/Organization   Time    _____________________________________________________________________________  Nurse Anesthetist Signature     Date   Time  _____________________________________________________________________________  Anesthesiologist Signature     Date   Time  I have discussed the procedure and information above with the patient (or patient’s representative) and answered their questions. The patient or their representative has agreed to have anesthesia services.    _____________________________________________________________________________  Witness        Date   Time  I have verified that the signature is that of the patient or patient’s representative, and that it was signed before the procedure  Patient Name: Maxx Ceja     : 3/14/1985                 Printed: 2024 at 1:46 PM    Medical Record #: [id number]                                            Page 1 of 1  ----------ANESTHESIA CONSENT----------

## (undated) NOTE — LETTER
[address], [place], [place]  Authorization for Surgical Operation and Procedure                                                                                           I hereby authorize Sera Guevara MD, my physician and his/her assistants (if applicable), which may include medical students, residents, and/or fellows, to perform the following surgical operation/ procedure and administer such anesthesia as may be determined necessary by my physician: Operation/Procedure name (s) ESOPHAGOGASTRODUODENOSCOPY on Maxx Ceja   2.   I recognize that during the surgical operation/procedure, unforeseen conditions may necessitate additional or different procedures than those listed above.  I, therefore, further authorize and request that the above-named surgeon, assistants, or designees perform such procedures as are, in their judgment, necessary and desirable.    3.   My surgeon/physician has discussed prior to my surgery the potential benefits, risks and side effects of this procedure; the likelihood of achieving goals; and potential problems that might occur during recuperation.  They also discussed reasonable alternatives to the procedure, including risks, benefits, and side effects related to the alternatives and risks related to not receiving this procedure.  I have had all my questions answered and I acknowledge that no guarantee has been made as to the result that may be obtained.    4.   Should the need arise during my operation/procedure, which includes change of level of care prior to discharge, I also consent to the administration of blood and/or blood products.  Further, I understand that despite careful testing and screening of blood or blood products by collecting agencies, I may still be subject to ill effects as a result of receiving a blood transfusion and/or blood products.  The following are some, but not all, of the potential risks that can occur:  fever and allergic reactions, hemolytic reactions, transmission of diseases such as Hepatitis, AIDS and Cytomegalovirus (CMV) and fluid overload.  In the event that I wish to have an autologous transfusion of my own blood, or a directed donor transfusion, I will discuss this with my physician.  Check only if Refusing Blood or Blood Products  I understand refusal of blood or blood products as deemed necessary by my physician may have serious consequences to my condition to include possible death. I hereby assume responsibility for my refusal and release the hospital, its personnel, and my physicians from any responsibility for the consequences of my refusal.    o  Refuse   5.   I authorize the use of any specimen, organs, tissues, body parts or foreign objects that may be removed from my body during the operation/procedure for diagnosis, research or teaching purposes and their subsequent disposal by hospital authorities.  I also authorize the release of specimen test results and/or written reports to my treating physician on the hospital medical staff or other referring or consulting physicians involved in my care, at the discretion of the Pathologist or my treating physician.    6.   I consent to the photographing or videotaping of the operations or procedures to be performed, including appropriate portions of my body for medical, scientific, or educational purposes, provided my identity is not revealed by the pictures or by descriptive texts accompanying them.  If the procedure has been photographed/videotaped, the surgeon will obtain the original picture, image, videotape or CD.  The hospital will not be responsible for storage, release or maintenance of the picture, image, tape or CD.    7.   I consent to the presence of a  or observers in the operating room as deemed necessary by my physician or their designees.    8.   I recognize that in the event my procedure results in extended  X-Ray/fluoroscopy time, I may develop a skin reaction.    9. If I have a Do Not Attempt Resuscitation (DNAR) order in place, that status will be suspended while in the operating room, procedural suite, and during the recovery period unless otherwise explicitly stated by me (or a person authorized to consent on my behalf). The surgeon or my attending physician will determine when the applicable recovery period ends for purposes of reinstating the DNAR order.  10. Patients having a sterilization procedure: I understand that if the procedure is successful the results will be permanent and it will therefore be impossible for me to inseminate, conceive, or bear children.  I also understand that the procedure is intended to result in sterility, although the result has not been guaranteed.   11. I acknowledge that my physician has explained sedation/analgesia administration to me including the risk and benefits I consent to the administration of sedation/analgesia as may be necessary or desirable in the judgment of my physician.    I CERTIFY THAT I HAVE READ AND FULLY UNDERSTAND THE ABOVE CONSENT TO OPERATION and/or OTHER PROCEDURE.     _________________________________________ _________________________________     ___________________________________  Signature of Patient     Signature of Responsible Person                   Printed Name of Responsible Person                              _________________________________________ ______________________________        ___________________________________  Signature of Witness         Date  Time         Relationship to Patient    STATEMENT OF PHYSICIAN My signature below affirms that prior to the time of the procedure; I have explained to the patient and/or his/her legal representative, the risks and benefits involved in the proposed treatment and any reasonable alternative to the proposed treatment. I have also explained the risks and benefits involved in refusal of the  proposed treatment and alternatives to the proposed treatment and have answered the patient's questions. If I have a significant financial interest in a co-management agreement or a significant financial interest in any product or implant, or other significant relationship used in this procedure/surgery, I have disclosed this and had a discussion with my patient.     _______________________________________________________________ _____________________________  (Signature of Physician)                                                                                         (Date)                                   (Time)  Patient Name: Maxx Ceja    : 3/14/1985   Printed: 2024      Medical Record #: [id number]                                              Page 1 of 1

## (undated) NOTE — LETTER
3/6/2024              Maxx Ceja        [address] [facility] [address]         Dear Maxx,    Our records indicate that the tests ordered for you by Sera Guevara MD  have not been done.    XR UGI/ESOPHAGUS DOUBLE CONTRAST (CPT=74246) (Order #[id number]) on 11/16/23       If you have, in fact, already completed the tests or you do not wish to have the tests done, please contact our office at THE NUMBER LISTED BELOW.  Otherwise, please proceed with the testing.          Sincerely,    Sera Guevara MD  [address] [address] [phone]